# Patient Record
Sex: MALE | Race: WHITE | NOT HISPANIC OR LATINO | URBAN - METROPOLITAN AREA
[De-identification: names, ages, dates, MRNs, and addresses within clinical notes are randomized per-mention and may not be internally consistent; named-entity substitution may affect disease eponyms.]

---

## 2018-09-06 ENCOUNTER — OFFICE VISIT (OUTPATIENT)
Dept: URBAN - METROPOLITAN AREA CLINIC 23 | Facility: CLINIC | Age: 43
End: 2018-09-06
Payer: COMMERCIAL

## 2018-09-06 DIAGNOSIS — H16.223 KERATOCONJUNCT SICCA, NOT SPECIFIED AS SJOGREN'S, BILATERAL: Primary | ICD-10-CM

## 2018-09-06 PROCEDURE — 92004 COMPRE OPH EXAM NEW PT 1/>: CPT | Performed by: OPTOMETRIST

## 2018-09-06 ASSESSMENT — INTRAOCULAR PRESSURE
OD: 14
OS: 14

## 2018-09-06 NOTE — IMPRESSION/PLAN
Impression: Keratoconjunct sicca, not specified as Sjogren's, bilateral: S39.713. Plan: Discussed diagnosis in detail with patient. Reassured patient of dry eyes and treatment. Will continue to observe condition/symptoms. Patient instructed to use ATs QID OU and Genteal gel QHS OU. Patient to call if symptoms progress. Sample of ATs given today.